# Patient Record
Sex: MALE | Race: WHITE | NOT HISPANIC OR LATINO | Employment: OTHER | URBAN - METROPOLITAN AREA
[De-identification: names, ages, dates, MRNs, and addresses within clinical notes are randomized per-mention and may not be internally consistent; named-entity substitution may affect disease eponyms.]

---

## 2017-03-22 PROBLEM — J96.92 RESPIRATORY FAILURE WITH HYPOXIA AND HYPERCAPNIA: Status: ACTIVE | Noted: 2017-03-22

## 2017-03-22 PROBLEM — J96.91 RESPIRATORY FAILURE WITH HYPOXIA AND HYPERCAPNIA: Status: ACTIVE | Noted: 2017-03-22

## 2017-03-23 PROBLEM — R06.02 SOB (SHORTNESS OF BREATH): Status: ACTIVE | Noted: 2017-03-23

## 2017-03-23 PROBLEM — N39.0 ACUTE UTI: Status: ACTIVE | Noted: 2017-03-23

## 2017-03-26 PROBLEM — J96.91 RESPIRATORY FAILURE WITH HYPOXIA AND HYPERCAPNIA: Status: RESOLVED | Noted: 2017-03-22 | Resolved: 2017-03-26

## 2017-03-26 PROBLEM — J96.92 RESPIRATORY FAILURE WITH HYPOXIA AND HYPERCAPNIA: Status: RESOLVED | Noted: 2017-03-22 | Resolved: 2017-03-26

## 2017-03-26 PROBLEM — R06.02 SOB (SHORTNESS OF BREATH): Status: RESOLVED | Noted: 2017-03-23 | Resolved: 2017-03-26

## 2017-03-26 PROBLEM — N39.0 ACUTE UTI: Status: RESOLVED | Noted: 2017-03-23 | Resolved: 2017-03-26

## 2017-10-24 PROBLEM — I10 ESSENTIAL HYPERTENSION: Status: ACTIVE | Noted: 2017-10-24

## 2017-10-24 PROBLEM — I46.9 CARDIAC ARREST: Status: ACTIVE | Noted: 2017-10-24

## 2017-10-24 PROBLEM — I07.1 TRICUSPID VALVE INSUFFICIENCY: Status: ACTIVE | Noted: 2017-10-24

## 2017-10-24 PROBLEM — I50.20 HEART FAILURE WITH REDUCED EJECTION FRACTION, NYHA CLASS II: Status: ACTIVE | Noted: 2017-10-24

## 2017-10-24 PROBLEM — Z86.39 H/O HYPERGLYCEMIA: Status: ACTIVE | Noted: 2017-10-24

## 2017-10-24 PROBLEM — Z78.9 STATIN INTOLERANCE: Status: ACTIVE | Noted: 2017-10-24

## 2017-10-24 PROBLEM — N18.30 STAGE 3 CHRONIC KIDNEY DISEASE: Status: ACTIVE | Noted: 2017-10-24

## 2017-10-24 PROBLEM — I25.84 CORONARY ARTERY DISEASE DUE TO CALCIFIED CORONARY LESION: Status: ACTIVE | Noted: 2017-10-24

## 2017-10-24 PROBLEM — I25.10 CORONARY ARTERY DISEASE DUE TO CALCIFIED CORONARY LESION: Status: ACTIVE | Noted: 2017-10-24
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
## 2018-04-20 PROBLEM — M25.532 BILATERAL WRIST PAIN: Status: ACTIVE | Noted: 2018-04-20

## 2018-04-20 PROBLEM — M25.531 BILATERAL WRIST PAIN: Status: ACTIVE | Noted: 2018-04-20

## 2018-04-20 PROBLEM — M54.2 NECK PAIN: Status: ACTIVE | Noted: 2018-04-20

## 2018-09-25 PROBLEM — I10 ESSENTIAL HYPERTENSION: Status: RESOLVED | Noted: 2017-10-24 | Resolved: 2018-09-25

## 2018-09-25 PROBLEM — M25.552 HIP PAIN, BILATERAL: Status: ACTIVE | Noted: 2018-09-25

## 2018-09-25 PROBLEM — M25.551 HIP PAIN, BILATERAL: Status: ACTIVE | Noted: 2018-09-25

## 2019-01-01 ENCOUNTER — HOSPITAL ENCOUNTER (EMERGENCY)
Facility: HOSPITAL | Age: 59
End: 2019-10-06
Attending: SURGERY
Payer: MEDICARE

## 2019-01-01 VITALS — DIASTOLIC BLOOD PRESSURE: 57 MMHG | SYSTOLIC BLOOD PRESSURE: 98 MMHG | TEMPERATURE: 98 F

## 2019-01-01 DIAGNOSIS — I46.9 CARDIAC ARREST: ICD-10-CM

## 2019-01-01 PROCEDURE — 25000003 PHARM REV CODE 250: Performed by: SURGERY

## 2019-01-01 PROCEDURE — 36556 INSERT NON-TUNNEL CV CATH: CPT

## 2019-01-01 PROCEDURE — 63600175 PHARM REV CODE 636 W HCPCS: Performed by: SURGERY

## 2019-01-01 PROCEDURE — 99900035 HC TECH TIME PER 15 MIN (STAT)

## 2019-01-01 PROCEDURE — 96374 THER/PROPH/DIAG INJ IV PUSH: CPT | Mod: 59

## 2019-01-01 PROCEDURE — 96375 TX/PRO/DX INJ NEW DRUG ADDON: CPT | Mod: 59

## 2019-01-01 PROCEDURE — 99900026 HC AIRWAY MAINTENANCE (STAT)

## 2019-01-01 PROCEDURE — 99291 CRITICAL CARE FIRST HOUR: CPT | Mod: 25

## 2019-01-01 PROCEDURE — 96376 TX/PRO/DX INJ SAME DRUG ADON: CPT | Mod: 59

## 2019-01-01 PROCEDURE — 96361 HYDRATE IV INFUSION ADD-ON: CPT

## 2019-01-01 RX ORDER — EPINEPHRINE 0.1 MG/ML
INJECTION INTRAVENOUS CODE/TRAUMA/SEDATION MEDICATION
Status: COMPLETED | OUTPATIENT
Start: 2019-01-01 | End: 2019-01-01

## 2019-01-01 RX ORDER — SODIUM BICARBONATE 1 MEQ/ML
SYRINGE (ML) INTRAVENOUS CODE/TRAUMA/SEDATION MEDICATION
Status: COMPLETED | OUTPATIENT
Start: 2019-01-01 | End: 2019-01-01

## 2019-01-01 RX ORDER — ATROPINE SULFATE 0.1 MG/ML
INJECTION INTRAVENOUS CODE/TRAUMA/SEDATION MEDICATION
Status: COMPLETED | OUTPATIENT
Start: 2019-01-01 | End: 2019-01-01

## 2019-01-01 RX ADMIN — EPINEPHRINE 1 MG: 0.1 INJECTION INTRACARDIAC; INTRAVENOUS at 07:10

## 2019-01-01 RX ADMIN — SODIUM CHLORIDE 1000 ML: 0.9 SOLUTION INTRAVENOUS at 07:10

## 2019-01-01 RX ADMIN — ATROPINE SULFATE 1 MG: 0.1 INJECTION, SOLUTION INTRAVENOUS at 07:10

## 2019-01-01 RX ADMIN — SODIUM BICARBONATE 50 MEQ: 84 INJECTION, SOLUTION INTRAVENOUS at 07:10

## 2019-01-08 PROBLEM — G89.29 CHRONIC NECK PAIN: Status: ACTIVE | Noted: 2019-01-01

## 2019-01-08 PROBLEM — M54.2 CHRONIC NECK PAIN: Status: ACTIVE | Noted: 2019-01-01

## 2019-01-08 PROBLEM — D75.89 MACROCYTOSIS: Status: ACTIVE | Noted: 2019-01-01

## 2019-01-08 PROBLEM — R20.2 NUMBNESS AND TINGLING: Status: ACTIVE | Noted: 2019-01-01

## 2019-01-08 PROBLEM — Z00.00 HEALTHCARE MAINTENANCE: Status: ACTIVE | Noted: 2019-01-01

## 2019-01-08 PROBLEM — G56.22 LESION OF LEFT ULNAR NERVE: Status: ACTIVE | Noted: 2019-01-01

## 2019-01-08 PROBLEM — R20.0 NUMBNESS AND TINGLING: Status: ACTIVE | Noted: 2019-01-01

## 2019-04-15 PROBLEM — Z00.00 HEALTHCARE MAINTENANCE: Status: RESOLVED | Noted: 2019-01-01 | Resolved: 2019-01-01

## 2019-08-06 PROBLEM — Z01.818 PREOP EXAMINATION: Status: ACTIVE | Noted: 2019-01-01

## 2019-08-06 PROBLEM — G31.84 MILD COGNITIVE IMPAIRMENT WITH MEMORY LOSS: Status: ACTIVE | Noted: 2019-01-01

## 2019-08-06 PROBLEM — Z72.0 TOBACCO ABUSE: Status: ACTIVE | Noted: 2019-01-01

## 2019-08-06 PROBLEM — Z12.11 COLON CANCER SCREENING: Status: ACTIVE | Noted: 2019-01-01

## 2019-08-06 PROBLEM — R41.3 MEMORY LOSS: Status: ACTIVE | Noted: 2019-01-01

## 2019-08-15 PROBLEM — I44.4 LEFT ANTERIOR FASCICULAR BLOCK (LAFB): Status: ACTIVE | Noted: 2019-01-01

## 2019-08-15 PROBLEM — Z01.818 PRE-OP EVALUATION: Status: ACTIVE | Noted: 2019-01-01

## 2019-08-15 PROBLEM — Z01.818 PREOP EXAMINATION: Status: RESOLVED | Noted: 2019-01-01 | Resolved: 2019-01-01

## 2019-09-18 PROBLEM — G56.22 CUBITAL TUNNEL SYNDROME ON LEFT: Status: ACTIVE | Noted: 2019-01-01

## 2019-10-07 NOTE — ED TRIAGE NOTES
59 y.o. male presents to ER ED 05/ED 05   Chief Complaint   Patient presents with    Cardiac Arrest   Pt brought to ER in cardiac arrest. Pt s/p carpel tunnel release surgery one week ago. EMS reports CPR for 20 minutes PTA, reports regained faint pulse in route, no pulse noted upon arrival. CPR in progress. Pt intubated and being bagged by EMS. No acute distress noted.

## 2019-10-07 NOTE — ED NOTES
ROSC achieved. Dr. Schrader spoke with family, family does not want patient to be coded again if heart stops again.

## 2019-10-07 NOTE — ED PROVIDER NOTES
[facility]                                                 Chief Complaint  59 y.o. male with Cardiac Arrest    History of Present Illness  Masoud Boland presents to the emergency room with cardiac arrest  Patient was found in his bed unresponsive frothing at the mouth per his wife  Patient was last seen normal 1 hour before this issue, EMS on scene there  EMS initially had a pulse then lost it, arrives to the ER doing CPR this p.m.  We continued CPR as well as code drugs, cardiac arrest on ER evaluation    The history is provided by the patient   device was not used during this ER visit  Medical history: CAD, carotid disease, anxiety, arthritis, HTN, PAD, stroke  Surgeries: Carotid endarterectomy, angioplasty, CABG, knee surgery, carpal tunnel surgery  Allergies: Claritin, amiodarone and digoxin    I have reviewed all of this patient's past medical, surgical, family, and social   histories as well as active allergies and medications documented in the  electronic medical record    Review of Systems and Physical Exam      Review of Systems  -- patient presents in cardiac arrest    Vital Signs  -- Blood pressure is 98/57 (abnormal) and his pulse is 0 (abnormal).   -- His respiration is 0 (abnormal) and oxygen saturation is 0% (abnormal).     Physical Exam  -- Nursing note and vitals reviewed.  -- Constitutional: pt is a code patient  -- Head: Atraumatic. Normocephalic  -- Eyes: fixed and dilated bilaterally  -- Ears: external ears and TM normal bilaterally. Normal hearing and no drainage  -- Nose: Nose normal in appearance, nares grossly normal. No discharge  -- Cardiac: no heart sounds noted  -- Vascular: no pulse noted peripherally or centrally  -- Pulmonary: no respirations noted  -- Chest: Chest not tender to palpation, sternum and ribs symmetrical.   -- Abdominal: Soft & flat, no tenderness. Normal bowel sounds. Normal liver and spleen, no hernia  --  Musculoskeletal: flaccid with no tone. Normal digits and nails, No clubbing or digits  -- Neurological: no neurologic activity with a GCS of 3  -- Skin: Skin is cool and dry. No evidence of rash or cellulitis. No abnormalities palpated     Emergency Room Course      Medications Given  EPINEPHrine 0.1 mg/mL injection (1 mg Intravenous Given 10/6/19 1937)   atropine injection (1 mg Intravenous Given 10/6/19 1916)   sodium bicarbonate 8.4 % (1 mEq/mL) injection (50 mEq Intravenous Given 10/6/19 1918)   sodium chloride 0.9% bolus (1,000 mLs Intravenous New Bag 10/6/19 1919)   EPINEPHrine 0.1 mg/mL injection (1 mg Intravenous Given 10/6/19 1937)     Central Line  -- Performed by: New Schrader MD  -- Date/Time: 8:13 PM 10/6/2019   -- Consent Done: Emergent Situation  -- Indications: vascular access  -- Anesthesia: local infiltration  -- Local anesthetic: lidocaine 1% without epinephrine  -- Anesthetic total: 5 ml  -- Preparation: skin prepped with ChloraPrep  -- Location details: right femoral  -- Catheter type: triple lumen  -- Catheter size: 7.5 Fr  -- Number of attempts: 1  -- Post-procedure: line sutured  -- Complications: none    Critical Care ED Physician Time (minutes):  -- Performed by: New Schrader M.D.  -- Date/Time: 8:13 PM 10/6/2019   -- Direct Patient Care (Face Time): 10  -- Additional History from Records or Taking Additional History: 10  -- Ordering, Reviewing, and Interpreting Diagnostic Studies: 10  -- Total Time in Documentation: 10  -- Consultation with Other Physicians: 10  -- Consultation with Family Related to Condition: 10  -- Total Critical Care Time: 60     ED Physician Management  -- Diagnosis management comments: 59 y.o. male in cardiac arrest  -- we momentarily got his pulse back but lost it again in the ER  -- I spoke with the patient's family at length, 13 previous MIs  -- Family states that he would not want a live on a ventilator  -- patient coded again at 7:52 p.m., family wished no  further code  -- patient had been coded for over an hour by EMS in the ER    Diagnosis  -- The encounter diagnosis was Cardiac arrest.    Disposition and Plan  -- Disposition:   home  -- Condition:      This note is dictated on M*Modal word recognition program.  There are word recognition mistakes that are occasionally missed on review.         New Schrader MD  10/06/19 2015